# Patient Record
Sex: FEMALE | Race: WHITE | Employment: OTHER | URBAN - METROPOLITAN AREA
[De-identification: names, ages, dates, MRNs, and addresses within clinical notes are randomized per-mention and may not be internally consistent; named-entity substitution may affect disease eponyms.]

---

## 2019-09-04 ENCOUNTER — OFFICE VISIT (OUTPATIENT)
Dept: FAMILY MEDICINE CLINIC | Age: 74
End: 2019-09-04

## 2019-09-04 VITALS
HEART RATE: 66 BPM | BODY MASS INDEX: 26.39 KG/M2 | RESPIRATION RATE: 20 BRPM | SYSTOLIC BLOOD PRESSURE: 145 MMHG | WEIGHT: 178.2 LBS | TEMPERATURE: 98.5 F | HEIGHT: 69 IN | OXYGEN SATURATION: 95 % | DIASTOLIC BLOOD PRESSURE: 73 MMHG

## 2019-09-04 DIAGNOSIS — E03.9 ACQUIRED HYPOTHYROIDISM: ICD-10-CM

## 2019-09-04 DIAGNOSIS — G62.9 PERIPHERAL POLYNEUROPATHY: ICD-10-CM

## 2019-09-04 DIAGNOSIS — M81.0 POSTMENOPAUSAL BONE LOSS: ICD-10-CM

## 2019-09-04 DIAGNOSIS — M19.90 ARTHRITIS: ICD-10-CM

## 2019-09-04 DIAGNOSIS — E78.5 HYPERLIPIDEMIA, UNSPECIFIED HYPERLIPIDEMIA TYPE: Primary | ICD-10-CM

## 2019-09-04 DIAGNOSIS — N95.1 VAGINAL DRYNESS, MENOPAUSAL: ICD-10-CM

## 2019-09-04 DIAGNOSIS — R60.9 EDEMA, UNSPECIFIED TYPE: ICD-10-CM

## 2019-09-04 DIAGNOSIS — M85.80 OSTEOPENIA, UNSPECIFIED LOCATION: ICD-10-CM

## 2019-09-04 DIAGNOSIS — I73.00 RAYNAUD'S PHENOMENON WITHOUT GANGRENE: ICD-10-CM

## 2019-09-04 DIAGNOSIS — K21.9 GASTROESOPHAGEAL REFLUX DISEASE, ESOPHAGITIS PRESENCE NOT SPECIFIED: ICD-10-CM

## 2019-09-04 DIAGNOSIS — R76.8 POSITIVE ANA (ANTINUCLEAR ANTIBODY): ICD-10-CM

## 2019-09-04 RX ORDER — BISMUTH SUBSALICYLATE 262 MG
1 TABLET,CHEWABLE ORAL DAILY
COMMUNITY

## 2019-09-04 RX ORDER — PANTOPRAZOLE SODIUM 40 MG/1
40 TABLET, DELAYED RELEASE ORAL DAILY
COMMUNITY

## 2019-09-04 RX ORDER — LEVOTHYROXINE SODIUM 75 UG/1
TABLET ORAL
COMMUNITY
End: 2019-09-23 | Stop reason: SDUPTHER

## 2019-09-04 RX ORDER — PRAVASTATIN SODIUM 10 MG/1
TABLET ORAL
COMMUNITY
End: 2019-09-04 | Stop reason: SDUPTHER

## 2019-09-04 RX ORDER — MELOXICAM 15 MG/1
TABLET ORAL
Refills: 0 | COMMUNITY
Start: 2019-08-14 | End: 2019-09-25 | Stop reason: SDUPTHER

## 2019-09-04 RX ORDER — PRAVASTATIN SODIUM 10 MG/1
10 TABLET ORAL
Qty: 90 TAB | Refills: 0 | Status: SHIPPED | OUTPATIENT
Start: 2019-09-04 | End: 2019-09-23 | Stop reason: SDUPTHER

## 2019-09-04 RX ORDER — ESTRADIOL 0.1 MG/G
CREAM VAGINAL 2 TIMES WEEKLY
COMMUNITY

## 2019-09-04 RX ORDER — LANOLIN ALCOHOL/MO/W.PET/CERES
1000 CREAM (GRAM) TOPICAL DAILY
COMMUNITY

## 2019-09-04 NOTE — PROGRESS NOTES
Brenda Guardian  Identified pt with two pt identifiers(name and ). Chief Complaint   Patient presents with    New Patient    Establish Care       1. Have you been to the ER, urgent care clinic since your last visit? Hospitalized since your last visit? No    2. Have you seen or consulted any other health care providers outside of the [address] [place] [address] since your last visit? Include any pap smears or colon screening. Dr. Candi Farah, Dr. Elvia Parra, Dr. De Luna Learn      Would you like to sign up for MyChart today, if you have not already done so? No  If not, would you like information on MyChart, and how to sign up at a later time? No      Medication reconciliation up to date and corrected with patient at this time. Today's provider has been notified of reason for visit, vitals and flowsheets obtained on patients. Reviewed record in preparation for visit, huddled with provider and have obtained necessary documentation.       Health Maintenance Due   Topic    Hepatitis C Screening     DTaP/Tdap/Td series (1 - Tdap)    BREAST CANCER SCRN MAMMOGRAM     FOBT Q 1 YEAR AGE 50-75     GLAUCOMA SCREENING Q2Y     Bone Densitometry (Dexa) Screening     Pneumococcal 65+ years (1 of 2 - PCV13)    MEDICARE YEARLY EXAM        Wt Readings from Last 3 Encounters:   19 178 lb 3.2 oz (80.8 kg)     Temp Readings from Last 3 Encounters:   19 98.5 °F (36.9 °C) (Oral)     BP Readings from Last 3 Encounters:   19 145/73     Pulse Readings from Last 3 Encounters:   19 66     Vitals:    19 1343   BP: 145/73   Pulse: 66   Resp: 20   Temp: 98.5 °F (36.9 °C)   TempSrc: Oral   SpO2: 95%   Weight: 178 lb 3.2 oz (80.8 kg)   Height: 5' 8.5\" (1.74 m)   PainSc:   0 - No pain         Learning Assessment:  :     Learning Assessment 2019   PRIMARY LEARNER Patient   PRIMARY LANGUAGE ENGLISH   LEARNER PREFERENCE PRIMARY READING   ANSWERED BY patient   RELATIONSHIP SELF       Depression Screening:  :     3 most recent PHQ Screens 9/4/2019   Little interest or pleasure in doing things Not at all   Feeling down, depressed, irritable, or hopeless Not at all   Total Score PHQ 2 0       Fall Risk Assessment:  :     Fall Risk Assessment, last 12 mths 9/4/2019   Able to walk? Yes   Fall in past 12 months? No       Abuse Screening:  :     Abuse Screening Questionnaire 9/4/2019   Do you ever feel afraid of your partner? N   Are you in a relationship with someone who physically or mentally threatens you? N   Is it safe for you to go home?  Y       ADL Screening:  :     ADL Assessment 9/4/2019   Feeding yourself No Help Needed   Getting from bed to chair No Help Needed   Getting dressed No Help Needed   Bathing or showering No Help Needed   Walk across the room (includes cane/walker) No Help Needed   Using the telphone No Help Needed   Taking your medications No Help Needed   Preparing meals No Help Needed   Managing money (expenses/bills) No Help Needed   Moderately strenuous housework (laundry) No Help Needed   Shopping for personal items (toiletries/medicines) No Help Needed   Shopping for groceries No Help Needed   Driving No Help Needed   Climbing a flight of stairs No Help Needed   Getting to places beyond walking distances No Help Needed

## 2019-09-04 NOTE — PROGRESS NOTES
Pt states had labs past June. Was seeing gyne-urol for vag dryness on Estrace cream.  Has hypothyroid, arthritis multi joints, On Mobic 4-6 weeks. Just went off it past week. Hyperlipids, GERD. GERD acted up past Fall. Had EGD past yr. Saw podiatrist here. Did some blood work. Brother is retired rheum and advised her to see rheum. Saw Dr. Gene Mercado week ago. Had more labs done. Gets numbness and burning in feet. Has Raynaud's in feet. Gets swelling in feet. Visit Vitals  /73 (BP 1 Location: Right arm, BP Patient Position: Sitting)   Pulse 66   Temp 98.5 °F (36.9 °C) (Oral)   Resp 20   Ht 5' 8.5\" (1.74 m)   Wt 178 lb 3.2 oz (80.8 kg)   LMP  (LMP Unknown)   SpO2 95%   BMI 26.70 kg/m²     Patient alert and cooperative. Reviewed above. Assessment:  1. Hyperlipids, on statin. 2. Osteopenia. 3. Vaginal dryness, on Estrace. 4. Hypothyroid, on replacement. 5. GERD, on PPI. 6. Arthritis. Recently went off Mobic on her own. Positive MANUEL. 7. Peripheral polyneuropathy. 8. Raynaud's. 9. Edema. Plan:  1. Get records from prior PCP and specialists, podiatrist and rheumatologist here with labs. 2. Set up referral to GYN urologist for vaginal symptoms. 3. Order repeat DEXA scan. 4. Refilled statin. 5. Follow otherwise here prn. TOTAL FACE TO FACE TIME OF 25 MINUTES SPENT WITH PATIENT. GREATER THAN 50% OF TIME WAS SPENT IN COUNSELING AND/OR COORDINATION OF CARE. SEE HPI, ASSESSMENT AND PLAN FOR DETAILS.

## 2019-09-23 ENCOUNTER — HOSPITAL ENCOUNTER (OUTPATIENT)
Dept: MAMMOGRAPHY | Age: 74
Discharge: HOME | End: 2019-09-23
Payer: MEDICARE

## 2019-09-23 DIAGNOSIS — E78.5 HYPERLIPIDEMIA, UNSPECIFIED HYPERLIPIDEMIA TYPE: ICD-10-CM

## 2019-09-23 DIAGNOSIS — M81.0 POSTMENOPAUSAL BONE LOSS: ICD-10-CM

## 2019-09-23 DIAGNOSIS — M85.80 OSTEOPENIA, UNSPECIFIED LOCATION: ICD-10-CM

## 2019-09-23 PROCEDURE — 77080 DXA BONE DENSITY AXIAL: CPT

## 2019-09-23 NOTE — TELEPHONE ENCOUNTER
Requested Prescriptions     Pending Prescriptions Disp Refills    pravastatin (PRAVACHOL) 10 mg tablet 90 Tab 0     Sig: Take 1 Tab by mouth nightly.  levothyroxine (SYNTHROID) 75 mcg tablet       Sig: Take  by mouth Daily (before breakfast). Patient need a refill on both of these prescriptions and would like to come in and  the script so she can compare prices.  Please call patient when ready

## 2019-09-24 RX ORDER — LEVOTHYROXINE SODIUM 75 UG/1
75 TABLET ORAL
Qty: 90 TAB | Refills: 1 | Status: SHIPPED | OUTPATIENT
Start: 2019-09-24

## 2019-09-24 RX ORDER — PRAVASTATIN SODIUM 10 MG/1
10 TABLET ORAL
Qty: 90 TAB | Refills: 0 | Status: SHIPPED | OUTPATIENT
Start: 2019-09-24

## 2019-09-24 NOTE — TELEPHONE ENCOUNTER
PCP: Chi Anguiano MD    Last appt: 9/4/2019  Future Appointments   Date Time Provider Sung Godfrey   1/17/2020  2:30 PM Shana Burris MD RDE [address] [facility] [address]       Requested Prescriptions     Pending Prescriptions Disp Refills    pravastatin (PRAVACHOL) 10 mg tablet 90 Tab 0     Sig: Take 1 Tab by mouth nightly.  levothyroxine (SYNTHROID) 75 mcg tablet       Sig: Take  by mouth Daily (before breakfast).        Prior labs and Blood pressures:  BP Readings from Last 3 Encounters:   09/04/19 145/73     No results found for: NA, K, CL, CO2, AGAP, GLU, BUN, CREA, BUCR, GFRAA, GFRNA, CA, GFRAA  No results found for: HBA1C, HGBE8, HHT8DIGA, QJJ7EWWA  No results found for: CHOL, CHOLPOCT, CHOLX, CHLST, CHOLV, HDL, HDLPOC, HDLP, LDL, LDLCPOC, LDLC, DLDLP, VLDLC, VLDL, TGLX, TRIGL, TRIGP, TGLPOCT, CHHD, CHHDX  No results found for: VITD3, XQVID2, XQVID3, XQVID, VD3RIA    No results found for: TSH, TSH2, TSH3, TSHP, TSHEXT

## 2019-09-26 RX ORDER — MELOXICAM 15 MG/1
TABLET ORAL
Qty: 30 TAB | Refills: 0 | Status: SHIPPED | OUTPATIENT
Start: 2019-09-26 | End: 2019-10-26 | Stop reason: SDUPTHER

## 2019-09-26 NOTE — TELEPHONE ENCOUNTER
PCP: Ilia Monreal MD    Last appt: 9/4/2019  Future Appointments   Date Time Provider Sung Godfrey   1/17/2020  2:30 PM Nela De La Cruz MD RDE [address] [facility] [address]       Requested Prescriptions     Pending Prescriptions Disp Refills    meloxicam (MOBIC) 15 mg tablet [Pharmacy Med Name: MELOXICAM 15 MG TABLET] 30 Tab 0     Sig: TAKE 1 TABLET BY MOUTH EVERY DAY       Prior labs and Blood pressures:  BP Readings from Last 3 Encounters:   09/04/19 145/73     No results found for: NA, K, CL, CO2, AGAP, GLU, BUN, CREA, BUCR, GFRAA, GFRNA, CA, GFRAA  No results found for: HBA1C, HGBE8, ZQH2ZQAA, DGU2ZKIP  No results found for: CHOL, CHOLPOCT, CHOLX, CHLST, CHOLV, HDL, HDLPOC, HDLP, LDL, LDLCPOC, LDLC, DLDLP, VLDLC, VLDL, TGLX, TRIGL, TRIGP, TGLPOCT, CHHD, CHHDX  No results found for: VITD3, XQVID2, XQVID3, XQVID, VD3RIA    No results found for: TSH, TSH2, TSH3, TSHP, TSHEXT

## 2019-09-27 ENCOUNTER — TELEPHONE (OUTPATIENT)
Dept: FAMILY MEDICINE CLINIC | Age: 74
End: 2019-09-27

## 2019-09-27 NOTE — TELEPHONE ENCOUNTER
Writer called patient regarding results and recommendations from Dr. Lana Levin. Writer spoke with patient. Patient verified . Writer notified patient. Patient verbalized understanding and appreciation. Patient stated that she is seeing a Rheumatologist, Dr. Marcelo Stack, this Wednesday 10/02/2019, stated she will call office to let Dr. Lana Levin know which labs he orders so she does not have to do it twice since she has been scheduled for Thursday 10/03/2019 to do labs for Dr. Lana Levin. Writer verbalized understanding and appreciation. Writer also notified patient that her 2 prescriptions have been signed and printed and are ready for . likely SIADH  on samsca

## 2019-10-01 DIAGNOSIS — E78.5 HYPERLIPIDEMIA, UNSPECIFIED HYPERLIPIDEMIA TYPE: ICD-10-CM

## 2019-10-01 DIAGNOSIS — M85.80 OSTEOPENIA, UNSPECIFIED LOCATION: ICD-10-CM

## 2019-10-01 DIAGNOSIS — E03.9 ACQUIRED HYPOTHYROIDISM: Primary | ICD-10-CM

## 2019-10-02 ENCOUNTER — TELEPHONE (OUTPATIENT)
Dept: FAMILY MEDICINE CLINIC | Age: 74
End: 2019-10-02

## 2019-10-02 NOTE — TELEPHONE ENCOUNTER
Patient would like the results of her Bone Density to be sent over to Dr. Syed Duffy in [place], [place]. Fax number is [phone]. Also patient wanted to know if she needed to come back in to see Dr. Conner Torres. Olive escobar.  Thanks Delivered by  section

## 2019-10-02 NOTE — TELEPHONE ENCOUNTER
Writer called patient back. Writer spoke with patient. Patient verified . Patient stated that she had [address] done in [place] this year, and has seen Rheumatologist. Patient needs fluid pill for the fluid retention, has taken HCTZ in the passed, and it was has worked fine. Patient getting blood work done tomorrow. Writer verbalized understanding and stated that message would be sent to Dr. Kenyon.

## 2019-10-02 NOTE — TELEPHONE ENCOUNTER
Writer called patient back regarding appointment. Patient did not answer. Writer left  requesting phone call back.

## 2019-10-03 DIAGNOSIS — R60.9 EDEMA, UNSPECIFIED TYPE: Primary | ICD-10-CM

## 2019-10-03 RX ORDER — HYDROCHLOROTHIAZIDE 12.5 MG/1
12.5-25 TABLET ORAL DAILY
Qty: 60 TAB | Refills: 0 | Status: SHIPPED | OUTPATIENT
Start: 2019-10-03 | End: 2019-10-25 | Stop reason: SDUPTHER

## 2019-10-04 LAB
ALBUMIN SERPL-MCNC: 4 G/DL (ref 3.5–4.8)
ALBUMIN/GLOB SERPL: 1.7 {RATIO} (ref 1.2–2.2)
ALP SERPL-CCNC: 77 IU/L (ref 39–117)
ALT SERPL-CCNC: 16 IU/L (ref 0–32)
AST SERPL-CCNC: 21 IU/L (ref 0–40)
BILIRUB SERPL-MCNC: 0.6 MG/DL (ref 0–1.2)
BUN SERPL-MCNC: 15 MG/DL (ref 8–27)
BUN/CREAT SERPL: 13 (ref 12–28)
CALCIUM SERPL-MCNC: 9 MG/DL (ref 8.7–10.3)
CHLORIDE SERPL-SCNC: 106 MMOL/L (ref 96–106)
CHOLEST SERPL-MCNC: 202 MG/DL (ref 100–199)
CO2 SERPL-SCNC: 22 MMOL/L (ref 20–29)
CREAT SERPL-MCNC: 1.19 MG/DL (ref 0.57–1)
GLOBULIN SER CALC-MCNC: 2.3 G/DL (ref 1.5–4.5)
GLUCOSE SERPL-MCNC: 95 MG/DL (ref 65–99)
HDLC SERPL-MCNC: 64 MG/DL
INTERPRETATION, 910389: NORMAL
INTERPRETATION: NORMAL
LDLC SERPL CALC-MCNC: 99 MG/DL (ref 0–99)
PDF IMAGE, 910387: NORMAL
POTASSIUM SERPL-SCNC: 4.6 MMOL/L (ref 3.5–5.2)
PROT SERPL-MCNC: 6.3 G/DL (ref 6–8.5)
SODIUM SERPL-SCNC: 144 MMOL/L (ref 134–144)
T4 FREE SERPL-MCNC: 1.49 NG/DL (ref 0.82–1.77)
TRIGL SERPL-MCNC: 193 MG/DL (ref 0–149)
TSH SERPL DL<=0.005 MIU/L-ACNC: 1.63 UIU/ML (ref 0.45–4.5)
VLDLC SERPL CALC-MCNC: 39 MG/DL (ref 5–40)

## 2019-10-08 NOTE — TELEPHONE ENCOUNTER
Writer called patient to notify of results and recommendations from Dr. Kenneth Pacheco. Patient did not answer. Writer unable to leave VM, VM box has not been set up yet.

## 2019-10-18 DIAGNOSIS — N28.9 FUNCTION KIDNEY DECREASED: Primary | ICD-10-CM

## 2019-10-25 DIAGNOSIS — R60.9 EDEMA, UNSPECIFIED TYPE: ICD-10-CM

## 2019-10-25 NOTE — TELEPHONE ENCOUNTER
PCP: Anthony Pompa MD    Last appt: 10/3/2019  Future Appointments   Date Time Provider Sung Godfrey   1/17/2020  2:30 PM Alayna Pappas MD RDE [address] [facility] [address]       Requested Prescriptions     Pending Prescriptions Disp Refills    hydroCHLOROthiazide (HYDRODIURIL) 12.5 mg tablet [Pharmacy Med Name: HYDROCHLOROTHIAZIDE 12.5 MG TB] 60 Tab 0     Sig: TAKE 1-2 TABLETS BY MOUTH DAILY       Prior labs and Blood pressures:  BP Readings from Last 3 Encounters:   09/04/19 145/73     Lab Results   Component Value Date/Time    Sodium 144 10/03/2019 10:26 AM    Potassium 4.6 10/03/2019 10:26 AM    Chloride 106 10/03/2019 10:26 AM    CO2 22 10/03/2019 10:26 AM    Glucose 95 10/03/2019 10:26 AM    BUN 15 10/03/2019 10:26 AM    Creatinine 1.19 (H) 10/03/2019 10:26 AM    BUN/Creatinine ratio 13 10/03/2019 10:26 AM    GFR est AA 52 (L) 10/03/2019 10:26 AM    GFR est non-AA 45 (L) 10/03/2019 10:26 AM    Calcium 9.0 10/03/2019 10:26 AM     No results found for: HBA1C, HGBE8, IPA7CIDA, NEI1VEBM  Lab Results   Component Value Date/Time    Cholesterol, total 202 (H) 10/03/2019 10:26 AM    HDL Cholesterol 64 10/03/2019 10:26 AM    LDL, calculated 99 10/03/2019 10:26 AM    VLDL, calculated 39 10/03/2019 10:26 AM    Triglyceride 193 (H) 10/03/2019 10:26 AM     No results found for: SAMAN Murray    Lab Results   Component Value Date/Time    TSH 1.630 10/03/2019 10:26 AM

## 2019-10-29 RX ORDER — HYDROCHLOROTHIAZIDE 12.5 MG/1
TABLET ORAL
Qty: 180 TAB | Refills: 3 | Status: SHIPPED | OUTPATIENT
Start: 2019-10-29

## 2019-10-29 RX ORDER — MELOXICAM 15 MG/1
TABLET ORAL
Qty: 90 TAB | Refills: 3 | Status: SHIPPED | OUTPATIENT
Start: 2019-10-29 | End: 2019-11-15 | Stop reason: SDUPTHER

## 2019-10-29 NOTE — TELEPHONE ENCOUNTER
PCP: Anthony Pompa MD    Last appt: 10/3/2019  Future Appointments   Date Time Provider Sung Godfrey   1/17/2020  2:30 PM Alayna Pappas MD RDE [address] [facility] [address]       Requested Prescriptions     Pending Prescriptions Disp Refills    meloxicam (MOBIC) 15 mg tablet [Pharmacy Med Name: MELOXICAM 15 MG TABLET] 30 Tab 0     Sig: TAKE 1 TABLET BY MOUTH EVERY DAY       Prior labs and Blood pressures:  BP Readings from Last 3 Encounters:   09/04/19 145/73     Lab Results   Component Value Date/Time    Sodium 144 10/03/2019 10:26 AM    Potassium 4.6 10/03/2019 10:26 AM    Chloride 106 10/03/2019 10:26 AM    CO2 22 10/03/2019 10:26 AM    Glucose 95 10/03/2019 10:26 AM    BUN 15 10/03/2019 10:26 AM    Creatinine 1.19 (H) 10/03/2019 10:26 AM    BUN/Creatinine ratio 13 10/03/2019 10:26 AM    GFR est AA 52 (L) 10/03/2019 10:26 AM    GFR est non-AA 45 (L) 10/03/2019 10:26 AM    Calcium 9.0 10/03/2019 10:26 AM     No results found for: HBA1C, HGBE8, VUJ2BECQ, FJA6WSAG  Lab Results   Component Value Date/Time    Cholesterol, total 202 (H) 10/03/2019 10:26 AM    HDL Cholesterol 64 10/03/2019 10:26 AM    LDL, calculated 99 10/03/2019 10:26 AM    VLDL, calculated 39 10/03/2019 10:26 AM    Triglyceride 193 (H) 10/03/2019 10:26 AM     No results found for: SAMAN Murray    Lab Results   Component Value Date/Time    TSH 1.630 10/03/2019 10:26 AM

## 2019-11-15 NOTE — TELEPHONE ENCOUNTER
Requested Prescriptions     Pending Prescriptions Disp Refills    meloxicam (MOBIC) 15 mg tablet 90 Tab 3     Patient came into the office stating that she called in two days ago for the med request, didn't see an encounter but seen the rx was sent to the pharmacy on 10/29/19.  Patient called the pharmacy and was told they have no record of it

## 2019-11-18 RX ORDER — MELOXICAM 15 MG/1
TABLET ORAL
Qty: 90 TAB | Refills: 0 | Status: SHIPPED | OUTPATIENT
Start: 2019-11-18

## 2019-11-18 NOTE — TELEPHONE ENCOUNTER
PCP: Mallory Martinez MD    Last appt: 10/3/2019  No future appointments.     Requested Prescriptions     Pending Prescriptions Disp Refills    meloxicam (MOBIC) 15 mg tablet 90 Tab 3

## 2020-05-26 ENCOUNTER — HOSPITAL ENCOUNTER (OUTPATIENT)
Dept: MRI IMAGING | Age: 75
Discharge: HOME | End: 2020-05-26
Attending: INTERNAL MEDICINE
Payer: MEDICARE

## 2020-05-26 DIAGNOSIS — M54.40 LOW BACK PAIN WITH SCIATICA: ICD-10-CM

## 2020-05-26 DIAGNOSIS — R20.0 NUMBNESS OF LEFT LOWER EXTREMITY: ICD-10-CM

## 2020-05-26 PROCEDURE — 72148 MRI LUMBAR SPINE W/O DYE: CPT

## 2020-07-22 ENCOUNTER — HOSPITAL ENCOUNTER (OUTPATIENT)
Dept: GENERAL RADIOLOGY | Age: 75
Discharge: HOME | End: 2020-07-22
Attending: INTERNAL MEDICINE
Payer: MEDICARE

## 2020-07-22 DIAGNOSIS — R06.02 SHORTNESS OF BREATH: ICD-10-CM

## 2020-07-22 PROCEDURE — 71046 X-RAY EXAM CHEST 2 VIEWS: CPT

## 2022-02-09 ENCOUNTER — HOSPITAL ENCOUNTER (OUTPATIENT)
Dept: LAB | Age: 77
Discharge: HOME | End: 2022-02-09

## 2022-03-19 PROBLEM — N95.1 VAGINAL DRYNESS, MENOPAUSAL: Status: ACTIVE | Noted: 2019-09-04

## 2022-03-19 PROBLEM — I73.00 RAYNAUD'S PHENOMENON WITHOUT GANGRENE: Status: ACTIVE | Noted: 2019-09-04

## 2022-03-19 PROBLEM — K21.9 GASTROESOPHAGEAL REFLUX DISEASE: Status: ACTIVE | Noted: 2019-09-04

## 2022-03-19 PROBLEM — R76.8 POSITIVE ANA (ANTINUCLEAR ANTIBODY): Status: ACTIVE | Noted: 2019-09-04

## 2022-03-19 PROBLEM — G62.9 PERIPHERAL POLYNEUROPATHY: Status: ACTIVE | Noted: 2019-09-04

## 2022-03-19 PROBLEM — R60.9 EDEMA: Status: ACTIVE | Noted: 2019-09-04

## 2022-03-19 PROBLEM — E03.9 ACQUIRED HYPOTHYROIDISM: Status: ACTIVE | Noted: 2019-09-04

## 2022-03-19 PROBLEM — M85.80 OSTEOPENIA: Status: ACTIVE | Noted: 2019-09-04

## 2022-03-20 PROBLEM — E78.5 HYPERLIPIDEMIA: Status: ACTIVE | Noted: 2019-09-04

## 2022-03-20 PROBLEM — M19.90 ARTHRITIS: Status: ACTIVE | Noted: 2019-09-04

## 2022-03-20 PROBLEM — M81.0 POSTMENOPAUSAL BONE LOSS: Status: ACTIVE | Noted: 2019-09-04

## 2023-05-21 RX ORDER — LEVOTHYROXINE SODIUM 0.07 MG/1
75 TABLET ORAL
COMMUNITY
Start: 2019-09-24

## 2023-05-21 RX ORDER — ESTRADIOL 0.1 MG/G
CREAM VAGINAL
COMMUNITY

## 2023-05-21 RX ORDER — PRAVASTATIN SODIUM 10 MG
10 TABLET ORAL
COMMUNITY
Start: 2019-09-24

## 2023-05-21 RX ORDER — PANTOPRAZOLE SODIUM 40 MG/1
40 TABLET, DELAYED RELEASE ORAL DAILY
COMMUNITY

## 2023-05-21 RX ORDER — HYDROCHLOROTHIAZIDE 12.5 MG/1
TABLET ORAL
COMMUNITY
Start: 2019-10-29

## 2023-05-21 RX ORDER — MELOXICAM 15 MG/1
1 TABLET ORAL DAILY
COMMUNITY
Start: 2019-11-18

## 2023-05-23 ENCOUNTER — HOSPITAL ENCOUNTER (OUTPATIENT)
Age: 78
Discharge: HOME | End: 2023-05-26
Payer: MEDICARE

## 2023-05-23 DIAGNOSIS — R10.13 ABDOMINAL PAIN, EPIGASTRIC: ICD-10-CM

## 2023-05-23 DIAGNOSIS — Z80.0 FAMILY HISTORY OF MALIGNANT NEOPLASM OF GASTROINTESTINAL TRACT: ICD-10-CM

## 2023-05-23 PROCEDURE — 6360000004 HC RX CONTRAST MEDICATION: Performed by: RADIOLOGY

## 2023-05-23 PROCEDURE — 74160 CT ABDOMEN W/CONTRAST: CPT

## 2023-05-23 PROCEDURE — 74170 CT ABD WO CNTRST FLWD CNTRST: CPT

## 2023-05-23 RX ADMIN — IOPAMIDOL 100 ML: 755 INJECTION, SOLUTION INTRAVENOUS at 12:38

## 2024-10-01 ENCOUNTER — TRANSCRIBE ORDERS (OUTPATIENT)
Facility: HOSPITAL | Age: 79
End: 2024-10-01

## 2024-10-01 DIAGNOSIS — M81.0 OSTEOPOROSIS, UNSPECIFIED OSTEOPOROSIS TYPE, UNSPECIFIED PATHOLOGICAL FRACTURE PRESENCE: Primary | ICD-10-CM

## 2024-10-17 ENCOUNTER — HOSPITAL ENCOUNTER (OUTPATIENT)
Age: 79
Discharge: HOME | End: 2024-10-20
Payer: MEDICARE

## 2024-10-17 DIAGNOSIS — M81.0 OSTEOPOROSIS, UNSPECIFIED OSTEOPOROSIS TYPE, UNSPECIFIED PATHOLOGICAL FRACTURE PRESENCE: ICD-10-CM

## 2024-10-17 PROCEDURE — 77080 DXA BONE DENSITY AXIAL: CPT

## 2025-04-04 ENCOUNTER — TRANSCRIBE ORDERS (OUTPATIENT)
Facility: HOSPITAL | Age: 80
End: 2025-04-04

## 2025-04-04 ENCOUNTER — HOSPITAL ENCOUNTER (OUTPATIENT)
Dept: NON INVASIVE DIAGNOSTICS | Facility: HOSPITAL | Age: 80
Discharge: HOME | End: 2025-04-07
Payer: MEDICARE

## 2025-04-04 DIAGNOSIS — Z01.818 PREOP EXAMINATION: Primary | ICD-10-CM

## 2025-04-04 DIAGNOSIS — Z01.818 PREOP EXAMINATION: ICD-10-CM

## 2025-04-04 PROCEDURE — 93005 ELECTROCARDIOGRAM TRACING: CPT

## 2025-04-05 LAB
EKG ATRIAL RATE: 58 BPM
EKG DIAGNOSIS: NORMAL
EKG P AXIS: 81 DEGREES
EKG P-R INTERVAL: 180 MS
EKG Q-T INTERVAL: 462 MS
EKG QRS DURATION: 86 MS
EKG QTC CALCULATION (BAZETT): 453 MS
EKG R AXIS: 78 DEGREES
EKG T AXIS: 75 DEGREES
EKG VENTRICULAR RATE: 58 BPM

## 2025-04-21 ENCOUNTER — HOSPITAL ENCOUNTER (EMERGENCY)
Facility: HOSPITAL | Age: 80
Discharge: HOME | End: 2025-04-21
Attending: STUDENT IN AN ORGANIZED HEALTH CARE EDUCATION/TRAINING PROGRAM
Payer: MEDICARE

## 2025-04-21 ENCOUNTER — APPOINTMENT (OUTPATIENT)
Facility: HOSPITAL | Age: 80
End: 2025-04-21
Payer: MEDICARE

## 2025-04-21 VITALS
SYSTOLIC BLOOD PRESSURE: 153 MMHG | RESPIRATION RATE: 18 BRPM | DIASTOLIC BLOOD PRESSURE: 92 MMHG | TEMPERATURE: 99 F | BODY MASS INDEX: 30.56 KG/M2 | WEIGHT: 201 LBS | OXYGEN SATURATION: 93 % | HEART RATE: 75 BPM

## 2025-04-21 DIAGNOSIS — J18.9 PNEUMONIA OF LEFT LOWER LOBE DUE TO INFECTIOUS ORGANISM: ICD-10-CM

## 2025-04-21 DIAGNOSIS — J95.89 POSTOPERATIVE ATELECTASIS: Primary | ICD-10-CM

## 2025-04-21 DIAGNOSIS — J98.11 POSTOPERATIVE ATELECTASIS: Primary | ICD-10-CM

## 2025-04-21 LAB
ALBUMIN SERPL-MCNC: 3.5 G/DL (ref 3.5–5)
ALBUMIN/GLOB SERPL: 1.1 (ref 1.1–2.2)
ALP SERPL-CCNC: 111 U/L (ref 45–117)
ALT SERPL-CCNC: 23 U/L (ref 12–78)
ANION GAP SERPL CALC-SCNC: 7 MMOL/L (ref 2–12)
AST SERPL-CCNC: 21 U/L (ref 15–37)
BASOPHILS # BLD: 0.09 K/UL (ref 0–0.1)
BASOPHILS NFR BLD: 0.9 % (ref 0–1)
BILIRUB SERPL-MCNC: 0.9 MG/DL (ref 0.2–1)
BUN SERPL-MCNC: 11 MG/DL (ref 6–20)
BUN/CREAT SERPL: 11 (ref 12–20)
CALCIUM SERPL-MCNC: 9.1 MG/DL (ref 8.5–10.1)
CHLORIDE SERPL-SCNC: 102 MMOL/L (ref 97–108)
CO2 SERPL-SCNC: 29 MMOL/L (ref 21–32)
CREAT SERPL-MCNC: 1 MG/DL (ref 0.55–1.02)
DIFFERENTIAL METHOD BLD: ABNORMAL
EOSINOPHIL # BLD: 0.68 K/UL (ref 0–0.4)
EOSINOPHIL NFR BLD: 6.6 % (ref 0–7)
ERYTHROCYTE [DISTWIDTH] IN BLOOD BY AUTOMATED COUNT: 12.6 % (ref 11.5–14.5)
GLOBULIN SER CALC-MCNC: 3.2 G/DL (ref 2–4)
GLUCOSE SERPL-MCNC: 113 MG/DL (ref 65–100)
HCT VFR BLD AUTO: 41.9 % (ref 35–47)
HGB BLD-MCNC: 13.8 G/DL (ref 11.5–16)
IMM GRANULOCYTES # BLD AUTO: 0.03 K/UL (ref 0–0.04)
IMM GRANULOCYTES NFR BLD AUTO: 0.3 % (ref 0–0.5)
LYMPHOCYTES # BLD: 1.74 K/UL (ref 0.8–3.5)
LYMPHOCYTES NFR BLD: 16.9 % (ref 12–49)
MCH RBC QN AUTO: 29.3 PG (ref 26–34)
MCHC RBC AUTO-ENTMCNC: 32.9 G/DL (ref 30–36.5)
MCV RBC AUTO: 89 FL (ref 80–99)
MONOCYTES # BLD: 0.77 K/UL (ref 0–1)
MONOCYTES NFR BLD: 7.5 % (ref 5–13)
NEUTS SEG # BLD: 6.99 K/UL (ref 1.8–8)
NEUTS SEG NFR BLD: 67.8 % (ref 32–75)
NRBC # BLD: 0 K/UL (ref 0–0.01)
NRBC BLD-RTO: 0 PER 100 WBC
PLATELET # BLD AUTO: 296 K/UL (ref 150–400)
PMV BLD AUTO: 10.1 FL (ref 8.9–12.9)
POTASSIUM SERPL-SCNC: 3.5 MMOL/L (ref 3.5–5.1)
PROT SERPL-MCNC: 6.7 G/DL (ref 6.4–8.2)
RBC # BLD AUTO: 4.71 M/UL (ref 3.8–5.2)
SODIUM SERPL-SCNC: 138 MMOL/L (ref 136–145)
TROPONIN I SERPL HS-MCNC: 11 NG/L (ref 0–51)
WBC # BLD AUTO: 10.3 K/UL (ref 3.6–11)

## 2025-04-21 PROCEDURE — 96374 THER/PROPH/DIAG INJ IV PUSH: CPT

## 2025-04-21 PROCEDURE — 80053 COMPREHEN METABOLIC PANEL: CPT

## 2025-04-21 PROCEDURE — 84484 ASSAY OF TROPONIN QUANT: CPT

## 2025-04-21 PROCEDURE — 99285 EMERGENCY DEPT VISIT HI MDM: CPT

## 2025-04-21 PROCEDURE — 71045 X-RAY EXAM CHEST 1 VIEW: CPT

## 2025-04-21 PROCEDURE — 36415 COLL VENOUS BLD VENIPUNCTURE: CPT

## 2025-04-21 PROCEDURE — 6360000004 HC RX CONTRAST MEDICATION: Performed by: STUDENT IN AN ORGANIZED HEALTH CARE EDUCATION/TRAINING PROGRAM

## 2025-04-21 PROCEDURE — 93005 ELECTROCARDIOGRAM TRACING: CPT | Performed by: STUDENT IN AN ORGANIZED HEALTH CARE EDUCATION/TRAINING PROGRAM

## 2025-04-21 PROCEDURE — 6360000002 HC RX W HCPCS: Performed by: STUDENT IN AN ORGANIZED HEALTH CARE EDUCATION/TRAINING PROGRAM

## 2025-04-21 PROCEDURE — 85025 COMPLETE CBC W/AUTO DIFF WBC: CPT

## 2025-04-21 PROCEDURE — 2500000003 HC RX 250 WO HCPCS: Performed by: STUDENT IN AN ORGANIZED HEALTH CARE EDUCATION/TRAINING PROGRAM

## 2025-04-21 PROCEDURE — 71275 CT ANGIOGRAPHY CHEST: CPT

## 2025-04-21 RX ORDER — AZITHROMYCIN 250 MG/1
TABLET, FILM COATED ORAL
Qty: 6 TABLET | Refills: 0 | Status: SHIPPED | OUTPATIENT
Start: 2025-04-21 | End: 2025-05-01

## 2025-04-21 RX ORDER — IOPAMIDOL 755 MG/ML
100 INJECTION, SOLUTION INTRAVASCULAR
Status: COMPLETED | OUTPATIENT
Start: 2025-04-21 | End: 2025-04-21

## 2025-04-21 RX ADMIN — IOPAMIDOL 100 ML: 755 INJECTION, SOLUTION INTRAVENOUS at 12:33

## 2025-04-21 RX ADMIN — WATER 1000 MG: 1 INJECTION INTRAMUSCULAR; INTRAVENOUS; SUBCUTANEOUS at 12:46

## 2025-04-21 ASSESSMENT — PAIN SCALES - GENERAL: PAINLEVEL_OUTOF10: 4

## 2025-04-21 NOTE — DISCHARGE INSTRUCTIONS
Thank You!    It was a pleasure taking care of you in our Emergency Department today. We know that when you come to our Emergency Department, you are entrusting us with your health, comfort, and safety. Our physicians and nurses honor that trust, and truly appreciate the opportunity to care for you and your loved ones.      We also value your feedback. If you receive a survey about your Emergency Department experience today, please fill it out.  We care about our patients' feedback, and we listen to what you have to say.  Thank you.    Otto Jeff MD  ________________________________________________________________________  I have included a copy of your lab results and/or radiologic studies from today's visit so you can have them easily available at your follow-up visit. We hope you feel better and please do not hesitate to contact the ED if you have any questions at all!    Recent Results (from the past 12 hours)   EKG 12 Lead    Collection Time: 04/21/25  9:37 AM   Result Value Ref Range    Ventricular Rate 69 BPM    Atrial Rate 69 BPM    P-R Interval 180 ms    QRS Duration 86 ms    Q-T Interval 402 ms    QTc Calculation (Bazett) 430 ms    P Axis 60 degrees    R Axis 69 degrees    T Axis 67 degrees    Diagnosis       Normal sinus rhythm  Normal ECG  When compared with ECG of 04-APR-2025 11:44,  Criteria for Septal infarct are no longer present     CBC with Auto Differential    Collection Time: 04/21/25 10:06 AM   Result Value Ref Range    WBC 10.3 3.6 - 11.0 K/uL    RBC 4.71 3.80 - 5.20 M/uL    Hemoglobin 13.8 11.5 - 16.0 g/dL    Hematocrit 41.9 35.0 - 47.0 %    MCV 89.0 80.0 - 99.0 FL    MCH 29.3 26.0 - 34.0 PG    MCHC 32.9 30.0 - 36.5 g/dL    RDW 12.6 11.5 - 14.5 %    Platelets 296 150 - 400 K/uL    MPV 10.1 8.9 - 12.9 FL    Nucleated RBCs 0.0 0  WBC    nRBC 0.00 0.00 - 0.01 K/uL    Neutrophils % 67.8 32.0 - 75.0 %    Lymphocytes % 16.9 12.0 - 49.0 %    Monocytes % 7.5 5.0 - 13.0 %    Eosinophils %  with a doctor, nurse practitioner, or physician assistant for ongoing care. If your symptoms become worse or you do not improve as expected and you are unable to reach your usual health care provider, you should return to the Emergency Department. We are available 24 hours a day.    Please take your discharge instructions with you when you go to your follow-up appointment.     If a prescription has been provided, please have it filled as soon as possible to prevent a delay in treatment. Read the entire medication instruction sheet provided to you by the pharmacy. If you have any questions or reservations about taking the medication due to side effects or interactions with other medications, please call your primary care physician or contact the ER to speak with the charge nurse.     Please make an appointment with your family doctor or the physician you were referred to for follow-up of this visit as instructed on your discharge paperwork. Return to the ER if you are unable to be seen or if you are unable to be seen in a timely manner.    Should you experience abdominal pain lasting greater than 6 hours, chest pain, difficulty breathing, fever/chills, numbness/tingling, skin changes or other symptoms that concern you, return to the ED sooner. If you feel worse over the next 24 hours, please return to the ED. We are available 24 hours a day. Thank you for trusting us with your care!

## 2025-04-24 LAB
EKG ATRIAL RATE: 69 BPM
EKG DIAGNOSIS: NORMAL
EKG P AXIS: 60 DEGREES
EKG P-R INTERVAL: 180 MS
EKG Q-T INTERVAL: 402 MS
EKG QRS DURATION: 86 MS
EKG QTC CALCULATION (BAZETT): 430 MS
EKG R AXIS: 69 DEGREES
EKG T AXIS: 67 DEGREES
EKG VENTRICULAR RATE: 69 BPM

## 2025-04-29 NOTE — ED PROVIDER NOTES
[id number]  [phone]    If symptoms worsen    [facility] Emergency Department  [address]  [facility] [address]  [phone]    If symptoms worsen         DISCHARGE MEDICATIONS:     Medication List        START taking these medications      azithromycin 250 MG tablet  Commonly known as: ZITHROMAX  500mg on day 1 followed by 250mg on days 2 - 5            ASK your doctor about these medications      cyanocobalamin 1000 MCG tablet     estradiol 0.1 MG/GM vaginal cream  Commonly known as: ESTRACE     hydroCHLOROthiazide 12.5 MG tablet     levothyroxine 75 MCG tablet  Commonly known as: SYNTHROID     meloxicam 15 MG tablet  Commonly known as: MOBIC     oxyCODONE-acetaminophen 5-325 MG per tablet  Commonly known as: PERCOCET  Take 1 tablet by mouth every 4 hours as needed for Pain for up to 7 days. Max Daily Amount: 6 tablets  Ask about: Should I take this medication?     pantoprazole 40 MG tablet  Commonly known as: PROTONIX     pravastatin 10 MG tablet  Commonly known as: PRAVACHOL               Where to Get Your Medications        These medications were sent to [facility]/pharmacy #[id number] - [facility] [address] - P [phone] - F [phone]  [address], [facility] [address]      Phone: [phone]   azithromycin 250 MG tablet           DISCONTINUED MEDICATIONS:  Discharge Medication List as of 4/21/2025  2:13 PM          I am the Primary Clinician of Record.   Otto Jeff MD (electronically signed)      (Please note that parts of this dictation were completed with voice recognition software. Quite often unanticipated grammatical, syntax, homophones, and other interpretive errors are inadvertently transcribed by the computer software. Please disregards these errors. Please excuse any errors that have escaped final proofreading.)         Otto Jeff MD  04/29/25 0179